# Patient Record
Sex: FEMALE | Race: WHITE | Employment: FULL TIME | URBAN - METROPOLITAN AREA
[De-identification: names, ages, dates, MRNs, and addresses within clinical notes are randomized per-mention and may not be internally consistent; named-entity substitution may affect disease eponyms.]

---

## 2017-08-16 ENCOUNTER — NURSE ONLY (OUTPATIENT)
Dept: ENDOCRINOLOGY CLINIC | Facility: CLINIC | Age: 32
End: 2017-08-16

## 2017-08-16 DIAGNOSIS — O24.410 DIET CONTROLLED GESTATIONAL DIABETES MELLITUS (GDM) IN THIRD TRIMESTER: Primary | ICD-10-CM

## 2017-08-16 PROCEDURE — G0108 DIAB MANAGE TRN  PER INDIV: HCPCS

## 2017-08-17 VITALS
DIASTOLIC BLOOD PRESSURE: 77 MMHG | HEIGHT: 63 IN | HEART RATE: 98 BPM | SYSTOLIC BLOOD PRESSURE: 134 MMHG | WEIGHT: 243 LBS | BODY MASS INDEX: 43.05 KG/M2

## 2017-08-17 NOTE — PROGRESS NOTES
Misha Rivero  :2/10/1985 was seen for Gestational Diabetes Counseling: Individual/Group    Date: 2017       Assessment:/77   Pulse 98   Ht 63\"   Wt 243 lb   BMI 43.05 kg/m²     : 1  Para: 0  SONDRA:   History of GMD:  NO    Ob Provided:  Magali Diabetes and Pregnancy: A guide to self management  BG Log Sheets    Recommendations:      1. Follow recommended meal plan. 2. Begin testing fasting glucose and 2 hour after meals   3.  Bring glucose / food log to next MD office

## 2017-08-27 NOTE — PROGRESS NOTES
Outpatient Maternal-Fetal Medicine Consultation    Dear Dr. Katherine Holloway    Thank you for requesting ultrasound evaluation and maternal fetal medicine consultation on your patient Sarah Savage.   As you are aware she is a 33year old female  with a [place] gestation.   Biometry:  BPD 69.0 mm 64th% 27w5d (27w0d to 28w3d)  .0 mm 27th% 27w1d (25w1d to 29w2d)  .9 mm 72nd% 28w0d (27w2d to 28w5d)  FL 47.1 mm 8th% 25w5d (23w4d to 27w5d)  OFD 87.3 mm 32nd% 26w3d  TCD 30.7 mm 66th% 27w4d  HUM 44.2 mm 28th% parathyroid hypoplasia). Ninety percent of patients with DGS have deletions in chromosome 22q11.2.  This mutation is relatively common in the general population, and has been identified in patients with other similar syndromes, such as \"conotruncal anomali strong association with obesity in the general population, type 2 diabetes mellitus is one of the two most common medical complications of the obese .  The increased risk of type 2 diabetes is primarily related to an exaggerated increase in insulin r risk of neural tube defects increased significantly with maternal weight. The analysis found that overweight and obese pregnant women experienced significantly more stillbirths than normal weight women.       Increase  testing and Level 2 Ultras

## 2017-08-28 ENCOUNTER — OFFICE VISIT (OUTPATIENT)
Dept: PERINATAL CARE | Facility: HOSPITAL | Age: 32
End: 2017-08-28
Attending: OBSTETRICS & GYNECOLOGY
Payer: COMMERCIAL

## 2017-08-28 VITALS
HEIGHT: 63 IN | DIASTOLIC BLOOD PRESSURE: 61 MMHG | HEART RATE: 98 BPM | WEIGHT: 243 LBS | SYSTOLIC BLOOD PRESSURE: 125 MMHG | BODY MASS INDEX: 43.05 KG/M2

## 2017-08-28 DIAGNOSIS — Z84.89 FAMILY HISTORY OF GENETIC DISORDER: Primary | ICD-10-CM

## 2017-08-28 DIAGNOSIS — O99.213 OBESITY AFFECTING PREGNANCY IN THIRD TRIMESTER: ICD-10-CM

## 2017-08-28 PROCEDURE — 76811 OB US DETAILED SNGL FETUS: CPT

## 2017-08-28 PROCEDURE — 99243 OFF/OP CNSLTJ NEW/EST LOW 30: CPT

## 2017-08-28 RX ORDER — MELATONIN
325
COMMUNITY
End: 2017-11-17

## 2017-08-28 RX ORDER — CHOLECALCIFEROL (VITAMIN D3) 25 MCG
1 TABLET,CHEWABLE ORAL DAILY
COMMUNITY
End: 2019-08-07 | Stop reason: CLARIF

## 2017-09-06 ENCOUNTER — NURSE ONLY (OUTPATIENT)
Dept: ENDOCRINOLOGY CLINIC | Facility: CLINIC | Age: 32
End: 2017-09-06

## 2017-09-06 VITALS
WEIGHT: 244 LBS | BODY MASS INDEX: 43.23 KG/M2 | DIASTOLIC BLOOD PRESSURE: 71 MMHG | HEIGHT: 63 IN | SYSTOLIC BLOOD PRESSURE: 104 MMHG

## 2017-09-06 DIAGNOSIS — O24.410 DIET CONTROLLED GESTATIONAL DIABETES MELLITUS (GDM) IN THIRD TRIMESTER: Primary | ICD-10-CM

## 2017-09-06 PROCEDURE — G0108 DIAB MANAGE TRN  PER INDIV: HCPCS

## 2017-09-06 NOTE — PROGRESS NOTES
Javi Duane  : 2/10/1985 was seen for GDM Follow-Up Counseling    Date: 2017       Assessment: /71   Ht 63\"   Wt 244 lb   LMP 2017   BMI 43.22 kg/m²   Weight: Wt Readings from Last 6 Encounters:  17 : 244 lb  17 : 243 l in 2 weeks    Patient verbalized understanding and has no further questions at this time.     Megan Coats, RN, CDE

## 2017-09-14 ENCOUNTER — HOSPITAL ENCOUNTER (OUTPATIENT)
Facility: HOSPITAL | Age: 32
Setting detail: OBSERVATION
Discharge: HOME | End: 2017-09-15
Attending: OBSTETRICS & GYNECOLOGY | Admitting: OBSTETRICS & GYNECOLOGY
Payer: COMMERCIAL

## 2017-09-14 VITALS
DIASTOLIC BLOOD PRESSURE: 77 MMHG | TEMPERATURE: 98 F | BODY MASS INDEX: 43.05 KG/M2 | HEIGHT: 63 IN | SYSTOLIC BLOOD PRESSURE: 128 MMHG | WEIGHT: 243 LBS | HEART RATE: 100 BPM

## 2017-09-14 PROBLEM — Z34.90 PREGNANCY: Status: ACTIVE | Noted: 2017-09-14

## 2017-09-14 PROBLEM — Z34.90 PREGNANCY (HCC): Status: ACTIVE | Noted: 2017-09-14

## 2017-09-14 LAB
BILIRUBIN URINE: NEGATIVE
BLOOD URINE: NEGATIVE
BLOOD URINE: NEGATIVE
CONTROL RUN WITHIN 24 HOURS?: YES
CONTROL RUN WITHIN 24 HOURS?: YES
GLUCOSE URINE: NEGATIVE
GLUCOSE URINE: NEGATIVE
KETONE URINE: 15
KETONE URINE: >=160
LEUKOCYTE ESTERASE URINE: NEGATIVE
LEUKOCYTE ESTERASE URINE: NEGATIVE
NITRITE URINE: NEGATIVE
NITRITE URINE: NEGATIVE
PH URINE: 5 (ref 5–8)
PH URINE: 5.5 (ref 5–8)
PROTEIN URINE: NEGATIVE
SPEC GRAVITY: <=1.005 (ref 1–1.03)
SPEC GRAVITY: >=1.03 (ref 1–1.03)
URINE CLARITY: CLEAR
URINE CLARITY: CLEAR
URINE COLOR: YELLOW
URINE COLOR: YELLOW
UROBILINOGEN URINE: 0.2
UROBILINOGEN URINE: 0.2

## 2017-09-14 PROCEDURE — 96360 HYDRATION IV INFUSION INIT: CPT

## 2017-09-14 PROCEDURE — 96361 HYDRATE IV INFUSION ADD-ON: CPT

## 2017-09-14 PROCEDURE — 81002 URINALYSIS NONAUTO W/O SCOPE: CPT

## 2017-09-14 RX ORDER — SODIUM CHLORIDE, SODIUM LACTATE, POTASSIUM CHLORIDE, CALCIUM CHLORIDE 600; 310; 30; 20 MG/100ML; MG/100ML; MG/100ML; MG/100ML
INJECTION, SOLUTION INTRAVENOUS CONTINUOUS
Status: DISCONTINUED | OUTPATIENT
Start: 2017-09-14 | End: 2017-09-15

## 2017-09-15 NOTE — PROGRESS NOTES
Pt to triage with c/o cramping in her lower back and in front. Monitor applied to pt. Pt had diarrhea yesterday.

## 2017-09-15 NOTE — NST
Nonstress Test   Patient: Juana Vincent    Gestation: 64Q5E    NST:       Variability: Moderate           Accelerations: Yes           Decelerations: None            Baseline: 135 BPM           Uterine Irritability: No           Contractions: Not pres

## 2017-09-20 ENCOUNTER — NURSE ONLY (OUTPATIENT)
Dept: ENDOCRINOLOGY CLINIC | Facility: CLINIC | Age: 32
End: 2017-09-20

## 2017-09-20 VITALS — WEIGHT: 240 LBS | BODY MASS INDEX: 43 KG/M2

## 2017-09-20 DIAGNOSIS — O24.410 DIET CONTROLLED GESTATIONAL DIABETES MELLITUS (GDM) IN THIRD TRIMESTER: Primary | ICD-10-CM

## 2017-09-20 PROCEDURE — G0108 DIAB MANAGE TRN  PER INDIV: HCPCS

## 2017-09-21 NOTE — PROGRESS NOTES
Tony Smith  : 2/10/1985 was seen for GDM Follow-Up Counseling    Date: 2017       Assessment: Wt 240 lb   LMP 2017   BMI 42.51 kg/m²   Weight: Wt Readings from Last 6 Encounters:  17 : 240 lb  17 : 243 lb  17 : 244 l questions at this time.     Emelie Blizzard, RN, CDE

## 2017-09-26 NOTE — PROGRESS NOTES
Outpatient Maternal-Fetal Medicine Consultation     Dear Dr. Madelyn Freitas     Thank you for requesting a maternal fetal medicine consultation on your patient Misha Rivero.   As you are aware she is a 33year old female  with a [place] pregnancy and an Es gestation.   Biometry:  BPD 82.5 mm 89th% 33w1d (32w1d to 34w1d)  .7 mm 67th% 33w2d (30w2d to 36w1d)  .2 mm >95th% 33w6d (32w6d to 34w6d)  FL 55.2 mm <5th% 29w1d (27w0d to 31w1d)  .0 mm 72nd% 32w3d  TCD 39.3 mm 93rd% 34w0d  HUM 49.6 mm 10 diabetes mellitus. Data suggest that obese women should be encouraged to undertake a weight reduction program (diet, exercise, behavior modification, and possibly bariatric surgery in some cases) prior to attempting to conceive.              Due to its st operative times, wound infection, thromboembolism, and endometritis. Maternal obesity appears to be associated with a small increase in the absolute rate of some congenital anomalies, and the risk may increase with increasing maternal weight.   Derek Mitchell therapy; her compliance with her diabetic diet regimen was reviewed and it is poor. The patient is presently taking Metformin 500 mg in AM and midday and 1000 mg in the evening; her compliance with her medication regimen was reviewed and it is good.

## 2017-09-27 ENCOUNTER — OFFICE VISIT (OUTPATIENT)
Dept: PERINATAL CARE | Facility: HOSPITAL | Age: 32
End: 2017-09-27
Attending: OBSTETRICS & GYNECOLOGY
Payer: COMMERCIAL

## 2017-09-27 VITALS
HEIGHT: 63 IN | DIASTOLIC BLOOD PRESSURE: 71 MMHG | WEIGHT: 243 LBS | SYSTOLIC BLOOD PRESSURE: 138 MMHG | BODY MASS INDEX: 43.05 KG/M2 | HEART RATE: 109 BPM

## 2017-09-27 DIAGNOSIS — E66.01 MORBID OBESITY WITH BMI OF 45.0-49.9, ADULT (HCC): ICD-10-CM

## 2017-09-27 DIAGNOSIS — IMO0001 INSULIN DEPENDENT GESTATIONAL DIABETES MELLITUS (GDM), ANTEPARTUM: ICD-10-CM

## 2017-09-27 PROCEDURE — 76819 FETAL BIOPHYS PROFIL W/O NST: CPT | Performed by: OBSTETRICS & GYNECOLOGY

## 2017-09-27 PROCEDURE — 99215 OFFICE O/P EST HI 40 MIN: CPT | Performed by: OBSTETRICS & GYNECOLOGY

## 2017-09-27 PROCEDURE — 76805 OB US >/= 14 WKS SNGL FETUS: CPT | Performed by: OBSTETRICS & GYNECOLOGY

## 2017-10-06 ENCOUNTER — TELEPHONE (OUTPATIENT)
Dept: PERINATAL CARE | Facility: HOSPITAL | Age: 32
End: 2017-10-06

## 2017-10-06 NOTE — TELEPHONE ENCOUNTER
Pt notified of changes in diabetic management. NPH am 10 units HS 26 units. Novolog am 12 units lunch 10 units dinner 12 units.  Verbalizes understanding

## 2017-10-18 ENCOUNTER — OFFICE VISIT (OUTPATIENT)
Dept: PERINATAL CARE | Facility: HOSPITAL | Age: 32
End: 2017-10-18
Attending: OBSTETRICS & GYNECOLOGY
Payer: COMMERCIAL

## 2017-10-18 VITALS
BODY MASS INDEX: 44 KG/M2 | HEART RATE: 87 BPM | DIASTOLIC BLOOD PRESSURE: 69 MMHG | WEIGHT: 246 LBS | SYSTOLIC BLOOD PRESSURE: 147 MMHG

## 2017-10-18 DIAGNOSIS — IMO0001 INSULIN DEPENDENT GESTATIONAL DIABETES MELLITUS (GDM), ANTEPARTUM: ICD-10-CM

## 2017-10-18 DIAGNOSIS — E66.01 MORBID OBESITY WITH BMI OF 40.0-44.9, ADULT (HCC): ICD-10-CM

## 2017-10-18 DIAGNOSIS — Z84.89 FAMILY HISTORY OF GENETIC DISORDER: ICD-10-CM

## 2017-10-18 PROCEDURE — 76805 OB US >/= 14 WKS SNGL FETUS: CPT | Performed by: OBSTETRICS & GYNECOLOGY

## 2017-10-18 PROCEDURE — 99213 OFFICE O/P EST LOW 20 MIN: CPT | Performed by: OBSTETRICS & GYNECOLOGY

## 2017-10-18 PROCEDURE — 76819 FETAL BIOPHYS PROFIL W/O NST: CPT | Performed by: OBSTETRICS & GYNECOLOGY

## 2017-10-18 NOTE — PROGRESS NOTES
Indication: follow-up for fetal growth.   ____________________________________________________________________________  History: Age: 33 years.  : 1 Para: 0.  ____________________________________________________________________________  Dating:  LMP: for  [employer] regarding obesity and diabetes . Her prenatal records and labs were reviewed. G1  Allergy - NKDA       Medication - Metformin, insulin      Medical Regimen Recommendation: begin NPH and novolog insulin.   NPH 10 units qAM, 20 un twice weekly  (weekly NST & Weekly BPP)  4. Plan delivery at 38 weeks for uncontrolled GDM A2  5. increase NPH 12 AM / 28 HS. No change in novolog. Thank you for allowing me to participate in the care of your patient.   Please do not hesitate to call

## 2017-10-26 ENCOUNTER — TELEPHONE (OUTPATIENT)
Dept: PERINATAL CARE | Facility: HOSPITAL | Age: 32
End: 2017-10-26

## 2017-11-01 ENCOUNTER — TELEPHONE (OUTPATIENT)
Dept: PERINATAL CARE | Facility: HOSPITAL | Age: 32
End: 2017-11-01

## 2017-11-01 NOTE — TELEPHONE ENCOUNTER
Pt notified of changes in diabetic management. NPH am 12 units HS 32 units. Humalog am 12 units lunch 10 units PM 12 units.  ML

## 2017-11-08 ENCOUNTER — TELEPHONE (OUTPATIENT)
Dept: OBGYN UNIT | Facility: HOSPITAL | Age: 32
End: 2017-11-08

## 2017-11-13 ENCOUNTER — APPOINTMENT (OUTPATIENT)
Dept: OBGYN CLINIC | Facility: HOSPITAL | Age: 32
End: 2017-11-13
Payer: COMMERCIAL

## 2017-11-13 ENCOUNTER — HOSPITAL ENCOUNTER (INPATIENT)
Facility: HOSPITAL | Age: 32
LOS: 4 days | Discharge: HOME | End: 2017-11-17
Attending: OBSTETRICS & GYNECOLOGY | Admitting: OBSTETRICS & GYNECOLOGY
Payer: COMMERCIAL

## 2017-11-13 PROCEDURE — 86850 RBC ANTIBODY SCREEN: CPT | Performed by: OBSTETRICS & GYNECOLOGY

## 2017-11-13 PROCEDURE — 86780 TREPONEMA PALLIDUM: CPT | Performed by: OBSTETRICS & GYNECOLOGY

## 2017-11-13 PROCEDURE — 81002 URINALYSIS NONAUTO W/O SCOPE: CPT

## 2017-11-13 PROCEDURE — 84550 ASSAY OF BLOOD/URIC ACID: CPT | Performed by: OBSTETRICS & GYNECOLOGY

## 2017-11-13 PROCEDURE — 82962 GLUCOSE BLOOD TEST: CPT

## 2017-11-13 PROCEDURE — 86900 BLOOD TYPING SEROLOGIC ABO: CPT | Performed by: OBSTETRICS & GYNECOLOGY

## 2017-11-13 PROCEDURE — 80053 COMPREHEN METABOLIC PANEL: CPT | Performed by: OBSTETRICS & GYNECOLOGY

## 2017-11-13 PROCEDURE — 85027 COMPLETE CBC AUTOMATED: CPT | Performed by: OBSTETRICS & GYNECOLOGY

## 2017-11-13 PROCEDURE — 3E0P7VZ INTRODUCTION OF HORMONE INTO FEMALE REPRODUCTIVE, VIA NATURAL OR ARTIFICIAL OPENING: ICD-10-PCS | Performed by: OBSTETRICS & GYNECOLOGY

## 2017-11-13 PROCEDURE — 86901 BLOOD TYPING SEROLOGIC RH(D): CPT | Performed by: OBSTETRICS & GYNECOLOGY

## 2017-11-13 RX ORDER — SODIUM CHLORIDE, SODIUM LACTATE, POTASSIUM CHLORIDE, CALCIUM CHLORIDE 600; 310; 30; 20 MG/100ML; MG/100ML; MG/100ML; MG/100ML
INJECTION, SOLUTION INTRAVENOUS CONTINUOUS
Status: DISCONTINUED | OUTPATIENT
Start: 2017-11-13 | End: 2017-11-14

## 2017-11-13 RX ORDER — TRISODIUM CITRATE DIHYDRATE AND CITRIC ACID MONOHYDRATE 500; 334 MG/5ML; MG/5ML
30 SOLUTION ORAL AS NEEDED
Status: DISCONTINUED | OUTPATIENT
Start: 2017-11-13 | End: 2017-11-14

## 2017-11-13 RX ORDER — TERBUTALINE SULFATE 1 MG/ML
0.25 INJECTION, SOLUTION SUBCUTANEOUS AS NEEDED
Status: DISCONTINUED | OUTPATIENT
Start: 2017-11-13 | End: 2017-11-14

## 2017-11-13 RX ORDER — IBUPROFEN 600 MG/1
600 TABLET ORAL ONCE AS NEEDED
Status: DISCONTINUED | OUTPATIENT
Start: 2017-11-13 | End: 2017-11-14

## 2017-11-13 RX ORDER — DEXTROSE, SODIUM CHLORIDE, SODIUM LACTATE, POTASSIUM CHLORIDE, AND CALCIUM CHLORIDE 5; .6; .31; .03; .02 G/100ML; G/100ML; G/100ML; G/100ML; G/100ML
INJECTION, SOLUTION INTRAVENOUS AS NEEDED
Status: DISCONTINUED | OUTPATIENT
Start: 2017-11-13 | End: 2017-11-14

## 2017-11-13 RX ORDER — ZOLPIDEM TARTRATE 5 MG/1
5 TABLET ORAL NIGHTLY PRN
Status: DISCONTINUED | OUTPATIENT
Start: 2017-11-13 | End: 2017-11-14

## 2017-11-13 RX ORDER — DEXTROSE MONOHYDRATE 100 MG/ML
INJECTION, SOLUTION INTRAVENOUS CONTINUOUS
Status: DISCONTINUED | OUTPATIENT
Start: 2017-11-14 | End: 2017-11-14

## 2017-11-14 ENCOUNTER — ANESTHESIA EVENT (OUTPATIENT)
Dept: OBGYN UNIT | Facility: HOSPITAL | Age: 32
End: 2017-11-14

## 2017-11-14 ENCOUNTER — SURGERY (OUTPATIENT)
Age: 32
End: 2017-11-14

## 2017-11-14 ENCOUNTER — ANESTHESIA (OUTPATIENT)
Dept: OBGYN UNIT | Facility: HOSPITAL | Age: 32
End: 2017-11-14

## 2017-11-14 PROBLEM — O24.419 DM (DIABETES MELLITUS), GESTATIONAL: Status: ACTIVE | Noted: 2017-11-14

## 2017-11-14 PROCEDURE — 82962 GLUCOSE BLOOD TEST: CPT

## 2017-11-14 PROCEDURE — 3E033VJ INTRODUCTION OF OTHER HORMONE INTO PERIPHERAL VEIN, PERCUTANEOUS APPROACH: ICD-10-PCS | Performed by: OBSTETRICS & GYNECOLOGY

## 2017-11-14 RX ORDER — DEXTROSE, SODIUM CHLORIDE, SODIUM LACTATE, POTASSIUM CHLORIDE, AND CALCIUM CHLORIDE 5; .6; .31; .03; .02 G/100ML; G/100ML; G/100ML; G/100ML; G/100ML
INJECTION, SOLUTION INTRAVENOUS CONTINUOUS
Status: DISCONTINUED | OUTPATIENT
Start: 2017-11-15 | End: 2017-11-17

## 2017-11-14 RX ORDER — DIPHENHYDRAMINE HCL 25 MG
25 CAPSULE ORAL EVERY 4 HOURS PRN
Status: DISCONTINUED | OUTPATIENT
Start: 2017-11-14 | End: 2017-11-17

## 2017-11-14 RX ORDER — HYDROCODONE BITARTRATE AND ACETAMINOPHEN 5; 325 MG/1; MG/1
1 TABLET ORAL EVERY 4 HOURS PRN
Status: DISCONTINUED | OUTPATIENT
Start: 2017-11-14 | End: 2017-11-17

## 2017-11-14 RX ORDER — SODIUM CHLORIDE, SODIUM LACTATE, POTASSIUM CHLORIDE, CALCIUM CHLORIDE 600; 310; 30; 20 MG/100ML; MG/100ML; MG/100ML; MG/100ML
INJECTION, SOLUTION INTRAVENOUS CONTINUOUS
Status: DISCONTINUED | OUTPATIENT
Start: 2017-11-14 | End: 2017-11-14

## 2017-11-14 RX ORDER — DOCUSATE SODIUM 100 MG/1
100 CAPSULE, LIQUID FILLED ORAL
Status: DISCONTINUED | OUTPATIENT
Start: 2017-11-15 | End: 2017-11-17

## 2017-11-14 RX ORDER — ZOLPIDEM TARTRATE 5 MG/1
5 TABLET ORAL NIGHTLY PRN
Status: DISCONTINUED | OUTPATIENT
Start: 2017-11-14 | End: 2017-11-17

## 2017-11-14 RX ORDER — DIPHENHYDRAMINE HYDROCHLORIDE 50 MG/ML
25 INJECTION INTRAMUSCULAR; INTRAVENOUS ONCE AS NEEDED
Status: DISCONTINUED | OUTPATIENT
Start: 2017-11-14 | End: 2017-11-14

## 2017-11-14 RX ORDER — HYDROCODONE BITARTRATE AND ACETAMINOPHEN 10; 325 MG/1; MG/1
1 TABLET ORAL EVERY 4 HOURS PRN
Status: DISCONTINUED | OUTPATIENT
Start: 2017-11-14 | End: 2017-11-17

## 2017-11-14 RX ORDER — KETOROLAC TROMETHAMINE 30 MG/ML
30 INJECTION, SOLUTION INTRAMUSCULAR; INTRAVENOUS ONCE AS NEEDED
Status: DISCONTINUED | OUTPATIENT
Start: 2017-11-14 | End: 2017-11-14

## 2017-11-14 RX ORDER — DEXTROSE MONOHYDRATE 25 G/50ML
50 INJECTION, SOLUTION INTRAVENOUS
Status: DISCONTINUED | OUTPATIENT
Start: 2017-11-14 | End: 2017-11-14 | Stop reason: HOSPADM

## 2017-11-14 RX ORDER — METOCLOPRAMIDE HYDROCHLORIDE 5 MG/ML
10 INJECTION INTRAMUSCULAR; INTRAVENOUS EVERY 6 HOURS PRN
Status: DISCONTINUED | OUTPATIENT
Start: 2017-11-14 | End: 2017-11-17

## 2017-11-14 RX ORDER — ONDANSETRON 2 MG/ML
4 INJECTION INTRAMUSCULAR; INTRAVENOUS ONCE AS NEEDED
Status: DISCONTINUED | OUTPATIENT
Start: 2017-11-14 | End: 2017-11-14

## 2017-11-14 RX ORDER — SIMETHICONE 80 MG
80 TABLET,CHEWABLE ORAL 3 TIMES DAILY PRN
Status: DISCONTINUED | OUTPATIENT
Start: 2017-11-14 | End: 2017-11-17

## 2017-11-14 RX ORDER — CEFAZOLIN SODIUM 1 G/3ML
INJECTION, POWDER, FOR SOLUTION INTRAMUSCULAR; INTRAVENOUS
Status: DISCONTINUED | OUTPATIENT
Start: 2017-11-14 | End: 2017-11-14

## 2017-11-14 RX ORDER — IBUPROFEN 600 MG/1
600 TABLET ORAL EVERY 6 HOURS SCHEDULED
Status: DISCONTINUED | OUTPATIENT
Start: 2017-11-15 | End: 2017-11-17

## 2017-11-14 RX ORDER — DEXTROSE MONOHYDRATE 25 G/50ML
50 INJECTION, SOLUTION INTRAVENOUS
Status: DISCONTINUED | OUTPATIENT
Start: 2017-11-14 | End: 2017-11-14

## 2017-11-14 RX ORDER — HYDROMORPHONE HYDROCHLORIDE 1 MG/ML
0.4 INJECTION, SOLUTION INTRAMUSCULAR; INTRAVENOUS; SUBCUTANEOUS EVERY 5 MIN PRN
Status: DISCONTINUED | OUTPATIENT
Start: 2017-11-14 | End: 2017-11-14

## 2017-11-14 RX ORDER — ONDANSETRON 2 MG/ML
4 INJECTION INTRAMUSCULAR; INTRAVENOUS EVERY 6 HOURS PRN
Status: DISCONTINUED | OUTPATIENT
Start: 2017-11-14 | End: 2017-11-17

## 2017-11-14 RX ORDER — DIPHENHYDRAMINE HYDROCHLORIDE 50 MG/ML
12.5 INJECTION INTRAMUSCULAR; INTRAVENOUS EVERY 4 HOURS PRN
Status: DISCONTINUED | OUTPATIENT
Start: 2017-11-14 | End: 2017-11-17

## 2017-11-14 RX ORDER — KETOROLAC TROMETHAMINE 30 MG/ML
30 INJECTION, SOLUTION INTRAMUSCULAR; INTRAVENOUS EVERY 6 HOURS PRN
Status: DISPENSED | OUTPATIENT
Start: 2017-11-14 | End: 2017-11-15

## 2017-11-14 RX ORDER — NALBUPHINE HCL 10 MG/ML
2.5 AMPUL (ML) INJECTION
Status: DISCONTINUED | OUTPATIENT
Start: 2017-11-14 | End: 2017-11-14

## 2017-11-14 RX ORDER — BISACODYL 10 MG
10 SUPPOSITORY, RECTAL RECTAL
Status: DISCONTINUED | OUTPATIENT
Start: 2017-11-14 | End: 2017-11-17

## 2017-11-14 RX ORDER — METHYLERGONOVINE MALEATE 0.2 MG/1
0.2 TABLET ORAL EVERY 8 HOURS
Status: COMPLETED | OUTPATIENT
Start: 2017-11-14 | End: 2017-11-15

## 2017-11-14 RX ORDER — TRISODIUM CITRATE DIHYDRATE AND CITRIC ACID MONOHYDRATE 500; 334 MG/5ML; MG/5ML
30 SOLUTION ORAL ONCE
Status: COMPLETED | OUTPATIENT
Start: 2017-11-14 | End: 2017-11-14

## 2017-11-14 RX ORDER — NALBUPHINE HCL 10 MG/ML
2.5 AMPUL (ML) INJECTION EVERY 4 HOURS PRN
Status: DISCONTINUED | OUTPATIENT
Start: 2017-11-14 | End: 2017-11-17

## 2017-11-14 RX ORDER — SODIUM CHLORIDE, SODIUM LACTATE, POTASSIUM CHLORIDE, CALCIUM CHLORIDE 600; 310; 30; 20 MG/100ML; MG/100ML; MG/100ML; MG/100ML
INJECTION, SOLUTION INTRAVENOUS CONTINUOUS
Status: DISCONTINUED | OUTPATIENT
Start: 2017-11-14 | End: 2017-11-17

## 2017-11-14 RX ORDER — NALOXONE HYDROCHLORIDE 0.4 MG/ML
0.08 INJECTION, SOLUTION INTRAMUSCULAR; INTRAVENOUS; SUBCUTANEOUS
Status: ACTIVE | OUTPATIENT
Start: 2017-11-14 | End: 2017-11-15

## 2017-11-14 RX ORDER — HYDROMORPHONE HYDROCHLORIDE 1 MG/ML
0.4 INJECTION, SOLUTION INTRAMUSCULAR; INTRAVENOUS; SUBCUTANEOUS EVERY 2 HOUR PRN
Status: DISPENSED | OUTPATIENT
Start: 2017-11-14 | End: 2017-11-15

## 2017-11-14 NOTE — PROGRESS NOTES
Admit Note    Insulin dependent Gestational Diabetic admitted last night for induction - Cervidil placed. Sugars normal. Blood Pressures borderline so CMP ordered and Urine dip done with only trace protein.     Recent Labs   Lab  11/13/17 1923   GLU  96

## 2017-11-14 NOTE — H&P
Tila [facility] Patient Status:  Inpatient    2/10/1985 MRN [id number]   Location 1818 [facility] Attending Sarah Coombs MD   [facility] Day # 1 PCP None Pcp     Subjective:  Maria A Fritz discussed in detail with the patient. Pre-admission, admission, and post admission procedures and expectations were discussed in detail. All questions answered, all appropriate consents will be signed at the Hospital. Admission is planned for today.    In

## 2017-11-14 NOTE — ANESTHESIA PREPROCEDURE EVALUATION
PRE-OP EVALUATION    Patient Name: Jessica Jose    Pre-op Diagnosis: * No pre-op diagnosis entered *    Procedure(s):      Surgeon(s) and Role:     * Cherelle Garcia MD - Primary     * Annita Aguilar MD - Assisting Surgeon    Pre-op vitals reviewed. injection 32 Units 32 Units Subcutaneous Once at night   dextrose 10 % infusion  Intravenous Continuous       Outpatient Medications:    Insulin NPH, Human,, Isophane, 100 UNIT/ML Subcutaneous Suspension Pen-injector Inject 12 Units into the skin before br Airway      Mallampati: III  Mouth opening: >3 FB  TM distance: 4 - 6 cm  Neck ROM: full Cardiovascular    Cardiovascular exam normal.  Rhythm: regular  Rate: normal  (-) murmur   Dental    No notable dental history.          Pulmonary    Pulmonary ex

## 2017-11-14 NOTE — PROGRESS NOTES
S: pt noting cramping  O: oxytocin at 20    SVE: closed/high/soft,pelvis feels contracted  FHTs:130s mod variability  Ctx q 2-3    A/P:38+1 wk IUP , gest DM on insulin  No progress with induction.  Thorough discussion regarding continuing induction, with in

## 2017-11-15 PROCEDURE — 90471 IMMUNIZATION ADMIN: CPT

## 2017-11-15 PROCEDURE — 82962 GLUCOSE BLOOD TEST: CPT

## 2017-11-15 PROCEDURE — 85025 COMPLETE CBC W/AUTO DIFF WBC: CPT | Performed by: OBSTETRICS & GYNECOLOGY

## 2017-11-15 NOTE — PROGRESS NOTES
Report given to [facility] RN. Patient and infant left in stable condition. Infant will continued to be monitored for grunting and oxygen saturation.  Neonatologist made aware

## 2017-11-15 NOTE — PROGRESS NOTES
[facility]    Patients Name: China Head  Attending Physician: Kapil López MD  CSN: [id number]    Location:  2210/2210-A  MRN: [id number]    YOB: 1985  Admission Date: 11/13/2017     Obstetric Anesthesia Pain Progress Not

## 2017-11-15 NOTE — OPERATIVE REPORT
[facility]   Section - Operative Note    Felipe Vincent Patient Status:  Inpatient    2/10/1985 MRN [id number]   Location [address] [place] [address] Attending Adam Schaefer MD   Hosp Day # 1 PCP None Pcp   Preoperative Diag appearance. The uterine cavity was swept clean using a wet lap. The first layer of the hysterotomy was closed using 0 Vicryl. A second imbricating layer was placed with 0 Vicryl. The incision was inspected for hemostasis.   Re-inspection of the hystero

## 2017-11-15 NOTE — ANESTHESIA POSTPROCEDURE EVALUATION
Yogesh 66 Patient Status:  Inpatient   Age/Gender 33year old female MRN [id number]   Location [address] [place] [address] Attending Shun Deras MD   [facility] Day # 1 PCP None Pcp       Anesthesia Post-op Note    Proce

## 2017-11-15 NOTE — PROGRESS NOTES
[facility]  Post-Partum Caesarean Section Progress Note    Trang Giron Patient Status:  Inpatient    2/10/1985 MRN [id number]   [facility] 2SW-J Attending Meera Acevedo MD   [facility] Day # 2 PCP None Pcp     SUBJECTIVE:    P

## 2017-11-15 NOTE — PROGRESS NOTES
Pt transferred  to Mother Baby room 2210 in stable condition. Report given to MARILIN Garcia. Infant transferred with mother in stable condition.

## 2017-11-16 NOTE — PROGRESS NOTES
[facility]  Post-Partum Caesarean Section Progress Note    Jeanna Colónpau Patient Status:  Inpatient    2/10/1985 MRN [id number]   [facility] 2SW-J Attending Elisa Young MD   [facility] Day # 3 PCP None Pcp     SUBJECTIVE:    P

## 2017-11-17 VITALS
SYSTOLIC BLOOD PRESSURE: 141 MMHG | WEIGHT: 248 LBS | BODY MASS INDEX: 43.94 KG/M2 | TEMPERATURE: 98 F | HEART RATE: 96 BPM | OXYGEN SATURATION: 96 % | DIASTOLIC BLOOD PRESSURE: 80 MMHG | HEIGHT: 63 IN | RESPIRATION RATE: 20 BRPM

## 2017-11-17 RX ORDER — HYDROCODONE BITARTRATE AND ACETAMINOPHEN 5; 325 MG/1; MG/1
1-2 TABLET ORAL EVERY 4 HOURS PRN
Qty: 40 TABLET | Refills: 0 | Status: SHIPPED | OUTPATIENT
Start: 2017-11-17 | End: 2019-08-07 | Stop reason: CLARIF

## 2017-11-17 NOTE — PROGRESS NOTES
[facility]  Post-Partum Caesarean Section Progress Note    Erin Romero Patient Status:  Inpatient    2/10/1985 MRN [id number]   [facility] 2SW-J Attending Haylie Sofia MD   [facility] Day # 4 PCP None Pcp     SUBJECTIVE:    P

## 2017-11-17 NOTE — DISCHARGE SUMMARY
[facility]  Discharge Summary    Cj Russ Patient Status:  Inpatient    2/10/1985 MRN [id number]   [facility] 2SW-J Attending Marco Antonio Adams MD   [address] Day # 4 PCP None Pcp         EDC: Estimated Date of Delivery:

## 2017-11-17 NOTE — PROGRESS NOTES
NURSING DISCHARGE NOTE    Discharged Home via Ambulatory. Accompanied by Spouse and Support staff  Belongings Taken by patient/family. Patient discharged home with baby. Hugs and kisses tags removed. Discharge teaching completed.  All questions answ

## 2017-11-21 ENCOUNTER — TELEPHONE (OUTPATIENT)
Dept: OBGYN UNIT | Facility: HOSPITAL | Age: 32
End: 2017-11-21

## 2017-11-21 NOTE — PROGRESS NOTES
Reviewed self and infant care w / mom, she verbalizes understanding of instructions reviewed. Encourage to follow up w/ MDs as directed and w/ questions/concerns. All PIH sx reviewed w good understanding. Enc to go to ct.

## 2018-01-20 ENCOUNTER — CHARTING TRANS (OUTPATIENT)
Dept: OTHER | Age: 33
End: 2018-01-20

## 2018-11-02 VITALS
SYSTOLIC BLOOD PRESSURE: 120 MMHG | WEIGHT: 223.21 LBS | DIASTOLIC BLOOD PRESSURE: 74 MMHG | BODY MASS INDEX: 39.55 KG/M2 | HEIGHT: 63 IN | TEMPERATURE: 98 F | RESPIRATION RATE: 16 BRPM | HEART RATE: 88 BPM | OXYGEN SATURATION: 97 %

## 2019-07-24 PROBLEM — Z30.430 ENCOUNTER FOR IUD INSERTION: Status: ACTIVE | Noted: 2019-07-24

## 2019-07-29 PROBLEM — Z34.90 PREGNANCY (HCC): Status: RESOLVED | Noted: 2017-09-14 | Resolved: 2019-07-29

## 2019-07-29 PROBLEM — O24.419 DM (DIABETES MELLITUS), GESTATIONAL: Status: RESOLVED | Noted: 2017-11-14 | Resolved: 2019-07-29

## 2019-07-29 PROBLEM — D82.1 DIGEORGE SYNDROME (HCC): Status: ACTIVE | Noted: 2019-07-29

## 2019-07-29 PROBLEM — IMO0001 INSULIN DEPENDENT GESTATIONAL DIABETES MELLITUS (GDM), ANTEPARTUM: Status: RESOLVED | Noted: 2017-09-27 | Resolved: 2019-07-29

## 2019-07-29 PROBLEM — Z98.891 H/O: CESAREAN SECTION: Status: ACTIVE | Noted: 2019-07-29

## 2019-07-29 PROBLEM — Z34.90 PREGNANCY: Status: RESOLVED | Noted: 2017-09-14 | Resolved: 2019-07-29

## 2019-08-07 PROBLEM — E66.01 OBESITY, CLASS III, BMI 40-49.9 (MORBID OBESITY) (HCC): Status: ACTIVE | Noted: 2019-08-07

## 2020-02-24 PROCEDURE — 87491 CHLMYD TRACH DNA AMP PROBE: CPT | Performed by: NURSE PRACTITIONER

## 2020-02-24 PROCEDURE — 87086 URINE CULTURE/COLONY COUNT: CPT | Performed by: NURSE PRACTITIONER

## 2020-02-24 PROCEDURE — 88175 CYTOPATH C/V AUTO FLUID REDO: CPT | Performed by: NURSE PRACTITIONER

## 2020-02-24 PROCEDURE — 87624 HPV HI-RISK TYP POOLED RSLT: CPT | Performed by: NURSE PRACTITIONER

## 2020-02-24 PROCEDURE — 87591 N.GONORRHOEAE DNA AMP PROB: CPT | Performed by: NURSE PRACTITIONER

## 2020-03-04 ENCOUNTER — LAB ENCOUNTER (OUTPATIENT)
Dept: LAB | Age: 35
End: 2020-03-04
Attending: NURSE PRACTITIONER
Payer: COMMERCIAL

## 2020-03-04 DIAGNOSIS — Z86.32 HISTORY OF GESTATIONAL DIABETES: ICD-10-CM

## 2020-03-04 DIAGNOSIS — O09.629 SUPERVISION OF HIGH-RISK PREGNANCY OF YOUNG MULTIGRAVIDA: ICD-10-CM

## 2020-03-04 LAB
ANTIBODY SCREEN: NEGATIVE
BASOPHILS # BLD AUTO: 0.03 X10(3) UL (ref 0–0.2)
BASOPHILS NFR BLD AUTO: 0.3 %
DEPRECATED RDW RBC AUTO: 39.4 FL (ref 35.1–46.3)
EOSINOPHIL # BLD AUTO: 0.06 X10(3) UL (ref 0–0.7)
EOSINOPHIL NFR BLD AUTO: 0.7 %
ERYTHROCYTE [DISTWIDTH] IN BLOOD BY AUTOMATED COUNT: 13.5 % (ref 11–15)
EST. AVERAGE GLUCOSE BLD GHB EST-MCNC: 169 MG/DL (ref 68–126)
GLUCOSE 1H P GLC SERPL-MCNC: 204 MG/DL
HBA1C MFR BLD HPLC: 7.5 % (ref ?–5.7)
HBV SURFACE AG SER-ACNC: <0.1 [IU]/L
HBV SURFACE AG SERPL QL IA: NONREACTIVE
HCT VFR BLD AUTO: 38.6 % (ref 35–48)
HGB BLD-MCNC: 12.1 G/DL (ref 12–16)
IMM GRANULOCYTES # BLD AUTO: 0.03 X10(3) UL (ref 0–1)
IMM GRANULOCYTES NFR BLD: 0.3 %
LYMPHOCYTES # BLD AUTO: 1.73 X10(3) UL (ref 1–4)
LYMPHOCYTES NFR BLD AUTO: 19.1 %
MCH RBC QN AUTO: 25.4 PG (ref 26–34)
MCHC RBC AUTO-ENTMCNC: 31.3 G/DL (ref 31–37)
MCV RBC AUTO: 80.9 FL (ref 80–100)
MONOCYTES # BLD AUTO: 0.43 X10(3) UL (ref 0.1–1)
MONOCYTES NFR BLD AUTO: 4.7 %
NEUTROPHILS # BLD AUTO: 6.79 X10 (3) UL (ref 1.5–7.7)
NEUTROPHILS # BLD AUTO: 6.79 X10(3) UL (ref 1.5–7.7)
NEUTROPHILS NFR BLD AUTO: 74.9 %
PLATELET # BLD AUTO: 337 10(3)UL (ref 150–450)
RBC # BLD AUTO: 4.77 X10(6)UL (ref 3.8–5.3)
RH BLOOD TYPE: POSITIVE
RUBV IGG SER QL: POSITIVE
RUBV IGG SER-ACNC: 189.4 IU/ML (ref 10–?)
T PALLIDUM AB SER QL IA: NONREACTIVE
WBC # BLD AUTO: 9.1 X10(3) UL (ref 4–11)

## 2020-03-04 PROCEDURE — 86762 RUBELLA ANTIBODY: CPT

## 2020-03-04 PROCEDURE — 36415 COLL VENOUS BLD VENIPUNCTURE: CPT

## 2020-03-04 PROCEDURE — 85025 COMPLETE CBC W/AUTO DIFF WBC: CPT

## 2020-03-04 PROCEDURE — 87389 HIV-1 AG W/HIV-1&-2 AB AG IA: CPT

## 2020-03-04 PROCEDURE — 86780 TREPONEMA PALLIDUM: CPT

## 2020-03-04 PROCEDURE — 86850 RBC ANTIBODY SCREEN: CPT

## 2020-03-04 PROCEDURE — 86901 BLOOD TYPING SEROLOGIC RH(D): CPT

## 2020-03-04 PROCEDURE — 82950 GLUCOSE TEST: CPT

## 2020-03-04 PROCEDURE — 86900 BLOOD TYPING SEROLOGIC ABO: CPT

## 2020-03-04 PROCEDURE — 87340 HEPATITIS B SURFACE AG IA: CPT

## 2020-03-04 PROCEDURE — 83036 HEMOGLOBIN GLYCOSYLATED A1C: CPT

## 2020-03-18 ENCOUNTER — DIABETIC EDUCATION (OUTPATIENT)
Dept: ENDOCRINOLOGY CLINIC | Facility: CLINIC | Age: 35
End: 2020-03-18
Payer: COMMERCIAL

## 2020-03-18 ENCOUNTER — LAB ENCOUNTER (OUTPATIENT)
Dept: LAB | Age: 35
End: 2020-03-18
Attending: OBSTETRICS & GYNECOLOGY
Payer: COMMERCIAL

## 2020-03-18 DIAGNOSIS — O24.312 PRE-EXISTING DIABETES MELLITUS DURING PREGNANCY IN SECOND TRIMESTER: Primary | ICD-10-CM

## 2020-03-18 DIAGNOSIS — Z36.0 SCREENING FOR CHROMOSOMAL ANOMALIES BY AMNIOCENTESIS: Primary | ICD-10-CM

## 2020-03-18 PROCEDURE — 36415 COLL VENOUS BLD VENIPUNCTURE: CPT

## 2020-03-18 PROCEDURE — 97802 MEDICAL NUTRITION INDIV IN: CPT | Performed by: DIETITIAN, REGISTERED

## 2020-03-18 NOTE — PROGRESS NOTES
Jeanna Chang   2/10/1985 was seen for Gestational Diabetes Counseling: Individual     Date: 3/18/2020  Start time: 12:30 End time: 1:15    : 2  Para: 1  EDC:     GDM Screen: A1c7.5% 3/4 Referring Provider: Nickie Dance  History of GDM:  YES 176, 152 (usually two hours)  Discussed monitoring ketones. Taking Medication:  Reviewed when medication might be indicated. Reducing Risk:  Effects of elevated blood glucose on mother/baby reviewed.   Discussed management (hyperglycemia, hypoglycemia

## 2020-03-24 PROBLEM — O24.419 GDM, CLASS A2: Status: ACTIVE | Noted: 2020-03-24

## 2020-03-24 PROBLEM — O99.211 OBESITY AFFECTING PREGNANCY IN FIRST TRIMESTER: Status: ACTIVE | Noted: 2020-03-24

## 2020-03-24 PROBLEM — O24.419 GDM, CLASS A2 (HCC): Status: ACTIVE | Noted: 2020-03-24

## 2020-05-04 ENCOUNTER — LAB ENCOUNTER (OUTPATIENT)
Dept: LAB | Age: 35
End: 2020-05-04
Attending: OBSTETRICS & GYNECOLOGY
Payer: COMMERCIAL

## 2020-05-04 DIAGNOSIS — O24.414 INSULIN CONTROLLED GESTATIONAL DIABETES MELLITUS (GDM) IN SECOND TRIMESTER: Primary | ICD-10-CM

## 2020-05-04 PROCEDURE — 36415 COLL VENOUS BLD VENIPUNCTURE: CPT

## 2020-05-04 PROCEDURE — 83036 HEMOGLOBIN GLYCOSYLATED A1C: CPT

## 2020-06-15 ENCOUNTER — OFFICE VISIT (OUTPATIENT)
Dept: PERINATAL CARE | Facility: HOSPITAL | Age: 35
End: 2020-06-15
Attending: OBSTETRICS & GYNECOLOGY
Payer: COMMERCIAL

## 2020-06-15 VITALS
HEART RATE: 120 BPM | SYSTOLIC BLOOD PRESSURE: 138 MMHG | BODY MASS INDEX: 42 KG/M2 | DIASTOLIC BLOOD PRESSURE: 87 MMHG | WEIGHT: 237 LBS

## 2020-06-15 DIAGNOSIS — O24.419 GDM, CLASS A2: ICD-10-CM

## 2020-06-15 DIAGNOSIS — Z98.891 H/O: CESAREAN SECTION: ICD-10-CM

## 2020-06-15 DIAGNOSIS — D82.1 DIGEORGE SYNDROME (HCC): ICD-10-CM

## 2020-06-15 DIAGNOSIS — E66.01 MORBID OBESITY WITH BMI OF 40.0-44.9, ADULT (HCC): ICD-10-CM

## 2020-06-15 PROCEDURE — 76825 ECHO EXAM OF FETAL HEART: CPT | Performed by: OBSTETRICS & GYNECOLOGY

## 2020-06-15 PROCEDURE — 93325 DOPPLER ECHO COLOR FLOW MAPG: CPT | Performed by: OBSTETRICS & GYNECOLOGY

## 2020-06-15 PROCEDURE — 76827 ECHO EXAM OF FETAL HEART: CPT | Performed by: OBSTETRICS & GYNECOLOGY

## 2020-06-15 PROCEDURE — 99214 OFFICE O/P EST MOD 30 MIN: CPT | Performed by: OBSTETRICS & GYNECOLOGY

## 2020-06-15 NOTE — PROGRESS NOTES
INR--2.3    Coumadin dose verified and maintained at 1 mg every Th; 2 mg all other days per protocol.    Chest CT scheduled for 20. Advised patient to contact anticoag clinic after appointment if he is started on any new medications as a result.    Next INR: 6/3/20    Anticoagulation Summary  As of 2020    INR goal:   2.0-3.0   TTR:   75.4 % (3.7 y)   INR used for dosin.3 (2020)   Warfarin maintenance plan:   1 mg (2 mg x 0.5) every Th; 2 mg (2 mg x 1) all other days   Weekly warfarin total:   13 mg   No change documented:   Amy Galindo RN   Plan last modified:   Neva Goddard RN (2020)   Next INR check:   6/3/2020   Priority:   Follow-Up - 4 Weeks   Target end date:   Indefinite    Indications    Paroxysmal atrial fibrillation (CMS/HCC) [I48.0]  Long term (current) use of anticoagulants (Resolved) [Z79.01]  AF (paroxysmal atrial fibrillation) (CMS/HCC) (Resolved) [I48.0]  Current use of long term anticoagulation [Z79.01]  AF (paroxysmal atrial fibrillation) (CMS/HCC) [I48.0]             Anticoagulation Episode Summary     INR check location:       Preferred lab:       Send INR reminders to:   [facility]    Comments:   Home # [phone] Cell # [phone]      Anticoagulation Care Providers     Provider Role Specialty Phone number    Lovely Bloom MD Referring Internal Medicine - Clinical Cardiac Electrophysiology [phone]        Patient was instructed to contact us with any unusual bleeding, bruising, any changes in medication, antibiotic use, diet or health status, or upcoming procedures. Patient was instructed to call with any questions or concerns.      Dr. Crocker is in clinic today supervising treatment.     Pt here for fetal echo  States +FM  No complaints

## 2020-06-15 NOTE — PROGRESS NOTES
Indication: dm,obesity. ____________________________________________________________________________  History: Age: 30 years. : 3 Para: 1.  Last menstrual period: 2019.  ________________________________________________________________________ the recommended four times daily assessments (fasting and two-hour post-prandial) is adequate. Of note the patient has not sent in her capillary blood glucose records since April 16 - she was sending   Her overall glucose control is inadequate.     Her com echocardiogram   3. Early onset gestational diabetes: Suspect pre-gestational diabetes due to early gestational age and elevated hemoglobin A1c  4. Family history of DiGeorge syndrome  5.   Morbid Obesity      RECOMMENDATIONS:  · Continue care with Dr. Slava Patricio

## 2020-07-13 ENCOUNTER — OFFICE VISIT (OUTPATIENT)
Dept: PERINATAL CARE | Facility: HOSPITAL | Age: 35
End: 2020-07-13
Attending: OBSTETRICS & GYNECOLOGY
Payer: COMMERCIAL

## 2020-07-13 VITALS
HEART RATE: 99 BPM | BODY MASS INDEX: 42 KG/M2 | DIASTOLIC BLOOD PRESSURE: 75 MMHG | WEIGHT: 239 LBS | SYSTOLIC BLOOD PRESSURE: 128 MMHG

## 2020-07-13 DIAGNOSIS — Z98.891 H/O: CESAREAN SECTION: ICD-10-CM

## 2020-07-13 DIAGNOSIS — O24.419 GDM, CLASS A2: ICD-10-CM

## 2020-07-13 DIAGNOSIS — E66.01 MORBID OBESITY WITH BMI OF 40.0-44.9, ADULT (HCC): ICD-10-CM

## 2020-07-13 PROCEDURE — 76816 OB US FOLLOW-UP PER FETUS: CPT | Performed by: OBSTETRICS & GYNECOLOGY

## 2020-07-13 PROCEDURE — 99214 OFFICE O/P EST MOD 30 MIN: CPT | Performed by: OBSTETRICS & GYNECOLOGY

## 2020-07-13 NOTE — PROGRESS NOTES
Indication: dm,obesity. ____________________________________________________________________________  History: Age: 30 years.  : 3 Para: 1.  ____________________________________________________________________________  Dating:  LMP: 2019 EDC: history of DiGeorge syndrome  · Obesity        DISCUSSION  During her visit we discussed and reviewed the following issues:  DIABETES - follow-up  ADA diet recommendations were reviewed and the patient's dietary compliance is adequate.   Her capillary blood trimester  · Weekly NSTs at 32 weeks; twice weekly NST's at 34 weeks        Thank you for allowing me to participate in the care of your patient. Please do not hesitate to call with any questions or concerns.     Total patient time was  25 minutes in evalu

## 2020-07-16 DIAGNOSIS — Z34.90 PREGNANCY: Primary | ICD-10-CM

## 2020-08-13 ENCOUNTER — APPOINTMENT (OUTPATIENT)
Dept: LAB | Age: 35
End: 2020-08-13
Attending: OBSTETRICS & GYNECOLOGY
Payer: COMMERCIAL

## 2020-09-02 PROCEDURE — 87653 STREP B DNA AMP PROBE: CPT | Performed by: OBSTETRICS & GYNECOLOGY

## 2020-09-02 PROCEDURE — 87081 CULTURE SCREEN ONLY: CPT | Performed by: OBSTETRICS & GYNECOLOGY

## 2020-09-08 ENCOUNTER — APPOINTMENT (OUTPATIENT)
Dept: LAB | Age: 35
End: 2020-09-08
Attending: NURSE PRACTITIONER
Payer: COMMERCIAL

## 2020-09-12 ENCOUNTER — HOSPITAL ENCOUNTER (INPATIENT)
Facility: HOSPITAL | Age: 35
LOS: 1 days | Discharge: HOME | DRG: 833 | End: 2020-09-13
Attending: OBSTETRICS & GYNECOLOGY | Admitting: OBSTETRICS & GYNECOLOGY
Payer: COMMERCIAL

## 2020-09-12 PROBLEM — E11.65 UNCONTROLLED DIABETES MELLITUS (HCC): Status: ACTIVE | Noted: 2020-09-12

## 2020-09-12 PROBLEM — IMO0002 UNCONTROLLED DIABETES MELLITUS: Status: ACTIVE | Noted: 2020-09-12

## 2020-09-12 PROBLEM — Z34.90 PREGNANCY: Status: ACTIVE | Noted: 2020-09-12

## 2020-09-12 PROBLEM — Z34.90 PREGNANCY (HCC): Status: ACTIVE | Noted: 2020-09-12

## 2020-09-12 LAB
ANTIBODY SCREEN: NEGATIVE
BASOPHILS # BLD AUTO: 0.03 X10(3) UL (ref 0–0.2)
BASOPHILS NFR BLD AUTO: 0.3 %
DEPRECATED RDW RBC AUTO: 41.3 FL (ref 35.1–46.3)
EOSINOPHIL # BLD AUTO: 0.09 X10(3) UL (ref 0–0.7)
EOSINOPHIL NFR BLD AUTO: 0.8 %
ERYTHROCYTE [DISTWIDTH] IN BLOOD BY AUTOMATED COUNT: 15.3 % (ref 11–15)
EST. AVERAGE GLUCOSE BLD GHB EST-MCNC: 137 MG/DL (ref 68–126)
GLUCOSE BLD-MCNC: 100 MG/DL (ref 70–99)
GLUCOSE BLD-MCNC: 170 MG/DL (ref 70–99)
GLUCOSE BLD-MCNC: 60 MG/DL (ref 70–99)
GLUCOSE BLD-MCNC: 89 MG/DL (ref 70–99)
HBA1C MFR BLD HPLC: 6.4 % (ref ?–5.7)
HCT VFR BLD AUTO: 33.6 % (ref 35–48)
HGB BLD-MCNC: 10.4 G/DL (ref 12–16)
IMM GRANULOCYTES # BLD AUTO: 0.05 X10(3) UL (ref 0–1)
IMM GRANULOCYTES NFR BLD: 0.4 %
LYMPHOCYTES # BLD AUTO: 1.9 X10(3) UL (ref 1–4)
LYMPHOCYTES NFR BLD AUTO: 16.2 %
MCH RBC QN AUTO: 23.3 PG (ref 26–34)
MCHC RBC AUTO-ENTMCNC: 31 G/DL (ref 31–37)
MCV RBC AUTO: 75.2 FL (ref 80–100)
MONOCYTES # BLD AUTO: 0.49 X10(3) UL (ref 0.1–1)
MONOCYTES NFR BLD AUTO: 4.2 %
NEUTROPHILS # BLD AUTO: 9.17 X10 (3) UL (ref 1.5–7.7)
NEUTROPHILS # BLD AUTO: 9.17 X10(3) UL (ref 1.5–7.7)
NEUTROPHILS NFR BLD AUTO: 78.1 %
PLATELET # BLD AUTO: 376 10(3)UL (ref 150–450)
RBC # BLD AUTO: 4.47 X10(6)UL (ref 3.8–5.3)
RH BLOOD TYPE: POSITIVE
SARS-COV-2 RNA RESP QL NAA+PROBE: NOT DETECTED
T PALLIDUM AB SER QL IA: NONREACTIVE
WBC # BLD AUTO: 11.7 X10(3) UL (ref 4–11)

## 2020-09-12 PROCEDURE — 82962 GLUCOSE BLOOD TEST: CPT

## 2020-09-12 PROCEDURE — 85025 COMPLETE CBC W/AUTO DIFF WBC: CPT | Performed by: OBSTETRICS & GYNECOLOGY

## 2020-09-12 PROCEDURE — 86780 TREPONEMA PALLIDUM: CPT | Performed by: OBSTETRICS & GYNECOLOGY

## 2020-09-12 PROCEDURE — 86900 BLOOD TYPING SEROLOGIC ABO: CPT | Performed by: OBSTETRICS & GYNECOLOGY

## 2020-09-12 PROCEDURE — 83036 HEMOGLOBIN GLYCOSYLATED A1C: CPT | Performed by: OBSTETRICS & GYNECOLOGY

## 2020-09-12 PROCEDURE — 86850 RBC ANTIBODY SCREEN: CPT | Performed by: OBSTETRICS & GYNECOLOGY

## 2020-09-12 PROCEDURE — 86901 BLOOD TYPING SEROLOGIC RH(D): CPT | Performed by: OBSTETRICS & GYNECOLOGY

## 2020-09-12 RX ORDER — ACETAMINOPHEN 500 MG
500 TABLET ORAL EVERY 6 HOURS PRN
Status: DISCONTINUED | OUTPATIENT
Start: 2020-09-12 | End: 2020-09-12

## 2020-09-12 RX ORDER — ACETAMINOPHEN 500 MG
500 TABLET ORAL EVERY 6 HOURS PRN
Status: DISCONTINUED | OUTPATIENT
Start: 2020-09-12 | End: 2020-09-13

## 2020-09-12 RX ORDER — INSULIN ASPART 100 [IU]/ML
16 INJECTION, SOLUTION INTRAVENOUS; SUBCUTANEOUS
Status: DISCONTINUED | OUTPATIENT
Start: 2020-09-12 | End: 2020-09-13

## 2020-09-12 RX ORDER — INSULIN ASPART 100 [IU]/ML
28 INJECTION, SOLUTION INTRAVENOUS; SUBCUTANEOUS
Status: ON HOLD | COMMUNITY
End: 2020-09-23

## 2020-09-12 RX ORDER — DEXTROSE, SODIUM CHLORIDE, SODIUM LACTATE, POTASSIUM CHLORIDE, AND CALCIUM CHLORIDE 5; .6; .31; .03; .02 G/100ML; G/100ML; G/100ML; G/100ML; G/100ML
INJECTION, SOLUTION INTRAVENOUS CONTINUOUS
Status: DISCONTINUED | OUTPATIENT
Start: 2020-09-12 | End: 2020-09-12

## 2020-09-12 RX ORDER — CALCIUM CARBONATE 200(500)MG
1000 TABLET,CHEWABLE ORAL
Status: DISCONTINUED | OUTPATIENT
Start: 2020-09-12 | End: 2020-09-13

## 2020-09-12 RX ORDER — ACETAMINOPHEN 500 MG
1000 TABLET ORAL EVERY 6 HOURS PRN
Status: DISCONTINUED | OUTPATIENT
Start: 2020-09-12 | End: 2020-09-13

## 2020-09-12 RX ORDER — INSULIN ASPART 100 [IU]/ML
16 INJECTION, SOLUTION INTRAVENOUS; SUBCUTANEOUS
Status: ON HOLD | COMMUNITY
End: 2020-09-23

## 2020-09-12 RX ORDER — CHOLECALCIFEROL (VITAMIN D3) 25 MCG
1 TABLET,CHEWABLE ORAL DAILY
Status: DISCONTINUED | OUTPATIENT
Start: 2020-09-12 | End: 2020-09-13

## 2020-09-12 RX ORDER — ACETAMINOPHEN 500 MG
1000 TABLET ORAL EVERY 6 HOURS PRN
Status: DISCONTINUED | OUTPATIENT
Start: 2020-09-12 | End: 2020-09-12 | Stop reason: SDUPTHER

## 2020-09-12 RX ORDER — INSULIN ASPART 100 [IU]/ML
28 INJECTION, SOLUTION INTRAVENOUS; SUBCUTANEOUS
Status: DISCONTINUED | OUTPATIENT
Start: 2020-09-12 | End: 2020-09-12 | Stop reason: SDUPTHER

## 2020-09-12 RX ORDER — INSULIN ASPART 100 [IU]/ML
4 INJECTION, SOLUTION INTRAVENOUS; SUBCUTANEOUS ONCE
Status: COMPLETED | OUTPATIENT
Start: 2020-09-12 | End: 2020-09-12

## 2020-09-12 RX ORDER — DOCUSATE SODIUM 100 MG/1
100 CAPSULE, LIQUID FILLED ORAL 2 TIMES DAILY
Status: DISCONTINUED | OUTPATIENT
Start: 2020-09-12 | End: 2020-09-13

## 2020-09-12 RX ORDER — DOCUSATE SODIUM 100 MG/1
100 CAPSULE, LIQUID FILLED ORAL 2 TIMES DAILY PRN
Status: DISCONTINUED | OUTPATIENT
Start: 2020-09-12 | End: 2020-09-13

## 2020-09-12 RX ORDER — INSULIN ASPART 100 [IU]/ML
16 INJECTION, SOLUTION INTRAVENOUS; SUBCUTANEOUS
Status: DISCONTINUED | OUTPATIENT
Start: 2020-09-13 | End: 2020-09-13

## 2020-09-12 RX ORDER — CALCIUM CARBONATE 200(500)MG
1000 TABLET,CHEWABLE ORAL
Status: DISCONTINUED | OUTPATIENT
Start: 2020-09-12 | End: 2020-09-12 | Stop reason: SDUPTHER

## 2020-09-12 RX ORDER — ZOLPIDEM TARTRATE 5 MG/1
5 TABLET ORAL NIGHTLY PRN
Status: DISCONTINUED | OUTPATIENT
Start: 2020-09-12 | End: 2020-09-13

## 2020-09-12 RX ORDER — INSULIN ASPART 100 [IU]/ML
28 INJECTION, SOLUTION INTRAVENOUS; SUBCUTANEOUS
Status: DISCONTINUED | OUTPATIENT
Start: 2020-09-12 | End: 2020-09-13

## 2020-09-12 NOTE — BH PROGRESS NOTE
24-20-52-61 Dietary office called spoke with Claudy Zamora - dietary supervisor notified per diet order pt is only to allowed 20-30 carbs for breakfast, 40-50 carbs for lunch, 40-60 carbs for dinner.

## 2020-09-12 NOTE — H&P
[place] [address] Patient Status:  Inpatient    2/10/1985 MRN [id number]   [facility] 1SW-J Attending Stephy Thakkar MD   Hosp Day # 0 PCP None Pcp     Subjective:  Sergio Roberson is a 30 year ol (negative Myesha's sign),  Trace edema       FHT:       Lab Review  Recent Labs   Lab 09/12/20  1055   RBC 4.47   HGB 10.4*   HCT 33.6*   MCV 75.2*   MCH 23.3*   MCHC 31.0   RDW 15.3*   NEPRELIM 9.17*   WBC 11.7*   .0         See Prenatal Record and

## 2020-09-12 NOTE — PLAN OF CARE
Problem: Patient/Family Goals  Goal: Patient/Family Long Term Goal  Description  Patient's Long Term Goal: Uncomplicated  section    Interventions:    VS per protocol  EFM per protocol  I&O  Chung catheter  Abd prep with clippers as needed  SCD t

## 2020-09-12 NOTE — PROGRESS NOTES
Pt is a 30year old female admitted to 1106/1106-A. Patient presents with:   Complication: GDM      Pt is  37w3d intra-uterine pregnancy. History obtained, consents signed. Oriented to room, staff, and plan of care.

## 2020-09-13 VITALS
DIASTOLIC BLOOD PRESSURE: 74 MMHG | HEIGHT: 63 IN | RESPIRATION RATE: 18 BRPM | TEMPERATURE: 98 F | BODY MASS INDEX: 43.77 KG/M2 | SYSTOLIC BLOOD PRESSURE: 137 MMHG | WEIGHT: 247 LBS | HEART RATE: 86 BPM

## 2020-09-13 LAB
GLUCOSE BLD-MCNC: 101 MG/DL (ref 70–99)
GLUCOSE BLD-MCNC: 108 MG/DL (ref 70–99)
GLUCOSE BLD-MCNC: 70 MG/DL (ref 70–99)
GLUCOSE BLD-MCNC: 86 MG/DL (ref 70–99)

## 2020-09-13 PROCEDURE — 82962 GLUCOSE BLOOD TEST: CPT

## 2020-09-13 NOTE — CONSULTS
[facility]  Maternal-Fetal Medicine Inpatient Consultation    Date of Admission:  9/12/2020  Date of Consult:  9/13/2020    Reason for Consult:   Gestational diabetes, suboptimal control    History of Present Illness:  Joelle schwartz a Known Problems Father    • Hypertension Mother    • Hypertension Maternal Grandmother    • Cancer Other         No family hx Breast/Ovarian/Colon Ca   • No Known Problems Brother       reports that she quit smoking about 3 years ago.  She has never used smo Baseline 125 BPM, moderate variability, accelerations noted, no decelerations. Reacting NST, category I tracing  [place] - irregular contractions.     NARRATIVE:   We discussed at length the fetal risks of uncontrolled blood sugars, the recommendations rega continue fetal movement monitoring 1-2 times daily    Thank you for allowing me to participate in the care of your patient. Please do not hesitate to contact me if additional questions or concerns arise.   The majority of the time (>50%) was spent in revie

## 2020-09-13 NOTE — PLAN OF CARE
Problem: Patient/Family Goals  Goal: Patient/Family Long Term Goal  Description  Patient's Long Term Goal: Uncomplicated  section    Interventions:    VS per protocol  EFM per protocol  I&O  Chung catheter  Abd prep with clippers as needed  SCD t hospitalization/illness  Description  INTERVENTIONS:  - Encourage verbalization of feelings/concerns/expectations  - Provide quiet environment  - Assist patient to identify own strengths and abilities  - Encourage patient to set small goals for self  - Enc

## 2020-09-13 NOTE — PLAN OF CARE
rn at bedside. Plan of care discussed, pt verbalizes understanding . Pt denies any concerns at this time.

## 2020-09-13 NOTE — PROGRESS NOTES
[facility]  Progress Note    Jeanna Chang Patient Status:  Inpatient    2/10/1985 MRN [id number]   [facility] 1SW-J Attending Xiomara Olvera MD   [facility] Day # 1 PCP None Pcp     Subjective:  Jeanna Chang is a 30year old fem

## 2020-09-14 DIAGNOSIS — Z34.90 PREGNANCY: Primary | ICD-10-CM

## 2020-09-14 NOTE — PROGRESS NOTES
Dr. Cris Fulton made aware of patient blood sugars at this time; Dr. Cris Fulton asked for MFM to be consulted once more.  If MFM clears her to go home then patient can be discharged home and to follow up on 9/15

## 2020-09-14 NOTE — PAYOR COMM NOTE
--------------  DISCHARGE REVIEW    Payor: Zita Kims #:  [id number]  Authorization Number: [id number]    Admit date: 9/12/20  Admit time:  8980  Discharge Date: 9/13/2020  8:50 PM     Admitting Physician: Hai Velez MD  Attending y

## 2020-09-14 NOTE — NST
Nonstress Test   Patient: Katarina Borrero    Gestation: 37w4d    NST:       Variability: Moderate           Accelerations: Yes           Decelerations: None            Baseline: 120 BPM           Uterine Irritability: No           Contractions: Not pres

## 2020-09-14 NOTE — PLAN OF CARE
Problem: Patient/Family Goals  Goal: Patient/Family Long Term Goal  Description  Patient's Long Term Goal: Uncomplicated  section    Interventions:    VS per protocol  EFM per protocol  I&O  Chung catheter  Abd prep with clippers as needed  SCD t Diabetes/Glucose Control  Goal: Glucose maintained within prescribed range  Description  INTERVENTIONS:  - Monitor Blood Glucose as ordered  - Assess for signs and symptoms of hyperglycemia and hypoglycemia  - Administer ordered medications to maintain glu

## 2020-09-14 NOTE — PAYOR COMM NOTE
--------------  ADMISSION REVIEW     Payor: Benita Verdin #:  [id number]  Authorization Number: [id number]    Admit date: 9/12/20  Admit time: 2561       Admitting Physician: John Jackson MD  Attending Physician:  No att. providers found extra ocular movement intact and sclera clear, anicteric   Lungs:   clear   Heart:   S1, S2 normal, no click, rub or gallop, regular rate and rhythm, with a physiologic 2/6 OSVALDO flow murmur.        Abdomen:  Uterus:  normal findings: soft, non-tender with gr Param Curtis RN      insulin aspart (NOVOLOG) 100 UNIT/ML vial 28 Units     Date Action Dose Route User    Discharged on 9/13/2020 9/13/2020 1828 Given 28 Units Subcutaneous (Left Lower Abdomen) Param Curtis RN      prenatal multivitamin plus Medical History:   Diagnosis Date   • Gestational diabetes       metformin 500mg tid            Past Surgical History:   Procedure Laterality Date   •    2017   •  SECTION N/A 2017     Performed by Kelli Keen MD at [facility] L+D OR (36.1 °C) (Temporal)   Resp 18   Ht 5' 3\" (1.6 m)   Wt 247 lb (112 kg)   LMP 12/25/2019 (LMP Unknown)   BMI 43.75 kg/m²   Temp:  [96.3 °F (35.7 °C)-98.8 °F (37.1 °C)] 97 °F (36.1 °C)  Pulse:  [85-96] 96  Resp:  [16-18] 18  BP: (125-140)/(60-70) 140/67  HE obesity  5. H/o , planning repeat 20     RECOMMENDATIONS:   1. Increase HS levemir to 84 units, no other diabetic medications changes  2. Consider discharge tonight of her postprandial BS are within target on the proper diet  3.   As an out

## 2020-09-14 NOTE — PROGRESS NOTES
Discharged to home per wheelchair by [facility] in stable condition with written and verbal instructions. Patient verbalizes understanding of information given.

## 2020-09-18 ENCOUNTER — APPOINTMENT (OUTPATIENT)
Dept: LAB | Age: 35
End: 2020-09-18
Attending: OBSTETRICS & GYNECOLOGY
Payer: COMMERCIAL

## 2020-09-18 DIAGNOSIS — Z34.90 PREGNANCY: ICD-10-CM

## 2020-09-19 ENCOUNTER — TELEPHONE (OUTPATIENT)
Dept: OBGYN UNIT | Facility: HOSPITAL | Age: 35
End: 2020-09-19

## 2020-09-19 LAB — SARS-COV-2 RNA RESP QL NAA+PROBE: NOT DETECTED

## 2020-09-20 ENCOUNTER — ANESTHESIA EVENT (OUTPATIENT)
Dept: OBGYN UNIT | Facility: HOSPITAL | Age: 35
End: 2020-09-20
Payer: COMMERCIAL

## 2020-09-20 NOTE — ANESTHESIA PREPROCEDURE EVALUATION
PRE-OP EVALUATION    Patient Name: Lisa Pappas    Pre-op Diagnosis: * No pre-op diagnosis entered *    Procedure(s):      Surgeon(s) and Role:     John Sofia MD - Primary    Pre-op vitals reviewed.         There is no height or weight on history.          Pulmonary    Pulmonary exam normal.                 Other findings            ASA: 3   Plan: spinal  NPO status verified and           Plan/risks discussed with: patient                Present on Admission:  **None**

## 2020-09-21 ENCOUNTER — ANESTHESIA (OUTPATIENT)
Dept: OBGYN UNIT | Facility: HOSPITAL | Age: 35
End: 2020-09-21
Payer: COMMERCIAL

## 2020-09-21 ENCOUNTER — TELEPHONE (OUTPATIENT)
Dept: OBGYN UNIT | Facility: HOSPITAL | Age: 35
End: 2020-09-21

## 2020-09-21 ENCOUNTER — HOSPITAL ENCOUNTER (INPATIENT)
Facility: HOSPITAL | Age: 35
LOS: 2 days | Discharge: HOME | End: 2020-09-23
Attending: OBSTETRICS & GYNECOLOGY | Admitting: OBSTETRICS & GYNECOLOGY
Payer: COMMERCIAL

## 2020-09-21 PROCEDURE — 86780 TREPONEMA PALLIDUM: CPT | Performed by: OBSTETRICS & GYNECOLOGY

## 2020-09-21 PROCEDURE — 86900 BLOOD TYPING SEROLOGIC ABO: CPT | Performed by: OBSTETRICS & GYNECOLOGY

## 2020-09-21 PROCEDURE — 86850 RBC ANTIBODY SCREEN: CPT | Performed by: OBSTETRICS & GYNECOLOGY

## 2020-09-21 PROCEDURE — 86901 BLOOD TYPING SEROLOGIC RH(D): CPT | Performed by: OBSTETRICS & GYNECOLOGY

## 2020-09-21 PROCEDURE — 82962 GLUCOSE BLOOD TEST: CPT

## 2020-09-21 PROCEDURE — 85025 COMPLETE CBC W/AUTO DIFF WBC: CPT | Performed by: OBSTETRICS & GYNECOLOGY

## 2020-09-21 DEVICE — INTERCEED: Type: IMPLANTABLE DEVICE | Status: FUNCTIONAL

## 2020-09-21 RX ORDER — DOCUSATE SODIUM 100 MG/1
100 CAPSULE, LIQUID FILLED ORAL
Status: DISCONTINUED | OUTPATIENT
Start: 2020-09-22 | End: 2020-09-23

## 2020-09-21 RX ORDER — DIPHENHYDRAMINE HYDROCHLORIDE 50 MG/ML
INJECTION INTRAMUSCULAR; INTRAVENOUS
Status: COMPLETED
Start: 2020-09-21 | End: 2020-09-21

## 2020-09-21 RX ORDER — NALOXONE HYDROCHLORIDE 0.4 MG/ML
0.08 INJECTION, SOLUTION INTRAMUSCULAR; INTRAVENOUS; SUBCUTANEOUS
Status: ACTIVE | OUTPATIENT
Start: 2020-09-21 | End: 2020-09-22

## 2020-09-21 RX ORDER — KETOROLAC TROMETHAMINE 30 MG/ML
30 INJECTION, SOLUTION INTRAMUSCULAR; INTRAVENOUS ONCE AS NEEDED
Status: COMPLETED | OUTPATIENT
Start: 2020-09-21 | End: 2020-09-21

## 2020-09-21 RX ORDER — MORPHINE SULFATE 2 MG/ML
INJECTION, SOLUTION INTRAMUSCULAR; INTRAVENOUS AS NEEDED
Status: DISCONTINUED | OUTPATIENT
Start: 2020-09-21 | End: 2020-09-21 | Stop reason: SURG

## 2020-09-21 RX ORDER — TRISODIUM CITRATE DIHYDRATE AND CITRIC ACID MONOHYDRATE 500; 334 MG/5ML; MG/5ML
30 SOLUTION ORAL ONCE
Status: COMPLETED | OUTPATIENT
Start: 2020-09-21 | End: 2020-09-21

## 2020-09-21 RX ORDER — CEFAZOLIN SODIUM/WATER 2 G/20 ML
2 SYRINGE (ML) INTRAVENOUS ONCE
Status: COMPLETED | OUTPATIENT
Start: 2020-09-21 | End: 2020-09-21

## 2020-09-21 RX ORDER — KETOROLAC TROMETHAMINE 30 MG/ML
INJECTION, SOLUTION INTRAMUSCULAR; INTRAVENOUS
Status: COMPLETED
Start: 2020-09-21 | End: 2020-09-21

## 2020-09-21 RX ORDER — BUPIVACAINE HYDROCHLORIDE 7.5 MG/ML
INJECTION, SOLUTION INTRASPINAL AS NEEDED
Status: DISCONTINUED | OUTPATIENT
Start: 2020-09-21 | End: 2020-09-21 | Stop reason: SURG

## 2020-09-21 RX ORDER — KETOROLAC TROMETHAMINE 30 MG/ML
30 INJECTION, SOLUTION INTRAMUSCULAR; INTRAVENOUS EVERY 6 HOURS PRN
Status: DISCONTINUED | OUTPATIENT
Start: 2020-09-21 | End: 2020-09-23

## 2020-09-21 RX ORDER — DIPHENHYDRAMINE HCL 25 MG
25 CAPSULE ORAL EVERY 4 HOURS PRN
Status: DISCONTINUED | OUTPATIENT
Start: 2020-09-21 | End: 2020-09-23

## 2020-09-21 RX ORDER — HYDROCODONE BITARTRATE AND ACETAMINOPHEN 10; 325 MG/1; MG/1
1 TABLET ORAL EVERY 4 HOURS PRN
Status: DISCONTINUED | OUTPATIENT
Start: 2020-09-21 | End: 2020-09-23

## 2020-09-21 RX ORDER — DIPHENHYDRAMINE HYDROCHLORIDE 50 MG/ML
25 INJECTION INTRAMUSCULAR; INTRAVENOUS ONCE AS NEEDED
Status: COMPLETED | OUTPATIENT
Start: 2020-09-21 | End: 2020-09-21

## 2020-09-21 RX ORDER — DEXTROSE MONOHYDRATE 25 G/50ML
50 INJECTION, SOLUTION INTRAVENOUS
Status: DISCONTINUED | OUTPATIENT
Start: 2020-09-21 | End: 2020-09-21 | Stop reason: HOSPADM

## 2020-09-21 RX ORDER — ONDANSETRON 2 MG/ML
4 INJECTION INTRAMUSCULAR; INTRAVENOUS ONCE AS NEEDED
Status: DISCONTINUED | OUTPATIENT
Start: 2020-09-21 | End: 2020-09-21 | Stop reason: HOSPADM

## 2020-09-21 RX ORDER — DIPHENHYDRAMINE HYDROCHLORIDE 50 MG/ML
12.5 INJECTION INTRAMUSCULAR; INTRAVENOUS EVERY 4 HOURS PRN
Status: DISCONTINUED | OUTPATIENT
Start: 2020-09-21 | End: 2020-09-23

## 2020-09-21 RX ORDER — SIMETHICONE 80 MG
80 TABLET,CHEWABLE ORAL 3 TIMES DAILY PRN
Status: DISCONTINUED | OUTPATIENT
Start: 2020-09-21 | End: 2020-09-23

## 2020-09-21 RX ORDER — MORPHINE SULFATE 0.5 MG/ML
0.2 INJECTION, SOLUTION EPIDURAL; INTRATHECAL; INTRAVENOUS ONCE
Status: DISCONTINUED | OUTPATIENT
Start: 2020-09-21 | End: 2020-09-21

## 2020-09-21 RX ORDER — BISACODYL 10 MG
10 SUPPOSITORY, RECTAL RECTAL
Status: DISCONTINUED | OUTPATIENT
Start: 2020-09-21 | End: 2020-09-23

## 2020-09-21 RX ORDER — IBUPROFEN 600 MG/1
600 TABLET ORAL EVERY 6 HOURS SCHEDULED
Status: DISCONTINUED | OUTPATIENT
Start: 2020-09-22 | End: 2020-09-23

## 2020-09-21 RX ORDER — HYDROCODONE BITARTRATE AND ACETAMINOPHEN 5; 325 MG/1; MG/1
1 TABLET ORAL EVERY 4 HOURS PRN
Status: DISCONTINUED | OUTPATIENT
Start: 2020-09-21 | End: 2020-09-23

## 2020-09-21 RX ORDER — MORPHINE SULFATE 4 MG/ML
2 INJECTION, SOLUTION INTRAMUSCULAR; INTRAVENOUS EVERY 5 MIN PRN
Status: DISCONTINUED | OUTPATIENT
Start: 2020-09-21 | End: 2020-09-21 | Stop reason: HOSPADM

## 2020-09-21 RX ORDER — SODIUM CHLORIDE, SODIUM LACTATE, POTASSIUM CHLORIDE, CALCIUM CHLORIDE 600; 310; 30; 20 MG/100ML; MG/100ML; MG/100ML; MG/100ML
INJECTION, SOLUTION INTRAVENOUS CONTINUOUS
Status: DISCONTINUED | OUTPATIENT
Start: 2020-09-21 | End: 2020-09-21 | Stop reason: HOSPADM

## 2020-09-21 RX ORDER — PHENYLEPHRINE HCL 10 MG/ML
VIAL (ML) INJECTION AS NEEDED
Status: DISCONTINUED | OUTPATIENT
Start: 2020-09-21 | End: 2020-09-21 | Stop reason: SURG

## 2020-09-21 RX ORDER — ONDANSETRON 2 MG/ML
4 INJECTION INTRAMUSCULAR; INTRAVENOUS EVERY 6 HOURS PRN
Status: DISCONTINUED | OUTPATIENT
Start: 2020-09-21 | End: 2020-09-23

## 2020-09-21 RX ORDER — DEXTROSE MONOHYDRATE 25 G/50ML
50 INJECTION, SOLUTION INTRAVENOUS
Status: DISCONTINUED | OUTPATIENT
Start: 2020-09-21 | End: 2020-09-23

## 2020-09-21 RX ORDER — SODIUM CHLORIDE, SODIUM LACTATE, POTASSIUM CHLORIDE, CALCIUM CHLORIDE 600; 310; 30; 20 MG/100ML; MG/100ML; MG/100ML; MG/100ML
INJECTION, SOLUTION INTRAVENOUS CONTINUOUS
Status: DISCONTINUED | OUTPATIENT
Start: 2020-09-21 | End: 2020-09-23

## 2020-09-21 RX ADMIN — MORPHINE SULFATE 0.2 MG: 2 INJECTION, SOLUTION INTRAMUSCULAR; INTRAVENOUS at 09:09:00

## 2020-09-21 RX ADMIN — PHENYLEPHRINE HCL 50 MCG: 10 MG/ML VIAL (ML) INJECTION at 09:00:00

## 2020-09-21 RX ADMIN — BUPIVACAINE HYDROCHLORIDE 1.6 ML: 7.5 INJECTION, SOLUTION INTRASPINAL at 09:09:00

## 2020-09-21 RX ADMIN — CEFAZOLIN SODIUM/WATER 2 G: 2 G/20 ML SYRINGE (ML) INTRAVENOUS at 08:53:00

## 2020-09-21 NOTE — ANESTHESIA POSTPROCEDURE EVALUATION
Yogesh 66 Patient Status:  Inpatient   Age/Gender 30year old female MRN [id number]   Location 1818 [facility] Attending Angie Ayala MD   Hosp Day # 0 PCP None Pcp       Anesthesia Post-op Note    Proce

## 2020-09-21 NOTE — PROGRESS NOTES
Pt admit to room 2193 from L/D. Report received. Mother and baby together in room. Teaching Materials given and discussed plan of care.

## 2020-09-21 NOTE — PROGRESS NOTES
Lavonne care done, pads/panties changed, abdominal binder applied. Pt tolerated well. Pt holding infant for transfer to mother baby unit room 2193 via cart, both pt and baby stable, with Rn , PCT and spouse.  Report updated to Janelle Ying Rn and Id bands verified x

## 2020-09-21 NOTE — PLAN OF CARE
Problem: BIRTH - VAGINAL/ SECTION  Goal: Fetal and maternal status remain reassuring during the birth process  Description: INTERVENTIONS:  - Monitor vital signs  - Monitor fetal heart rate  - Monitor uterine activity  - Monitor labor progression

## 2020-09-21 NOTE — ANESTHESIA PROCEDURE NOTES
Spinal Block  Performed by: Mary Anne Guardado MD  Authorized by: Mary Anne Guardado MD       General Information and Staff    Start Time:   Anesthesiologist: Mary Anne Guardado MD  Performed by:   Anesthesiologist  Patient Location:  OB  Site identification: surface land

## 2020-09-21 NOTE — PLAN OF CARE
Problem: Patient/Family Goals  Goal: Patient/Family Long Term Goal  Description: Patient's Long Term Goal: to have an uncomplicated delivery    Interventions:    - See additional Care Plan goals for specific interventions  Outcome: Progressing  Goal: Alia Tapia

## 2020-09-21 NOTE — PROGRESS NOTES
with edc 20 (38 week 5 days gestation) presents to triage 5 for 0930 R-C/s. Spouse supportive at side. Pt is gestational diabetic on metformin and insulin. Denies srom, vag bleeding or contractions. EFM tested/applied and explained.

## 2020-09-21 NOTE — OPERATIVE REPORT
[facility]   Section - Operative Note    Jasmine Bell Patient Status:  Inpatient    2/10/1985 MRN [id number]   Location [address] [facility] Attending Gini Morales MD   Hosp Day # 0 PCP None Pcp   Preoperative Diag the radiant warmer with Neonatology present. The placenta was delivered intact with a 3 vessel cord. Uterus, tubes and ovaries were normal in appearance. The uterine cavity was swept clean using a wet lap.  The first layer of the hysterotomy was closed

## 2020-09-22 PROCEDURE — 82962 GLUCOSE BLOOD TEST: CPT

## 2020-09-22 PROCEDURE — 85025 COMPLETE CBC W/AUTO DIFF WBC: CPT | Performed by: OBSTETRICS & GYNECOLOGY

## 2020-09-22 NOTE — PROGRESS NOTES
Dr Teresa Mckeon called to check on pt, report and labs given, doing well, no new orders. He will see her later today.

## 2020-09-22 NOTE — PLAN OF CARE
Problem: POSTPARTUM  Goal: Long Term Goal:Experiences normal postpartum course  Description: INTERVENTIONS:  - Assess and monitor vital signs and lab values. - Assess fundus and lochia. - Provide ice/sitz baths for perineum discomfort.   - Monitor heali pain/trauma. - Instruct and provide assistance with proper latch. - Review techniques for milk expression (breast pumping) and storage of breast milk. Provide pumping equipment/supplies, instructions and assistance, as needed.   - Encourage rooming-in and

## 2020-09-22 NOTE — PLAN OF CARE
Problem: BIRTH - VAGINAL/ SECTION  Goal: Fetal and maternal status remain reassuring during the birth process  Description: INTERVENTIONS:  - Monitor vital signs  - Monitor fetal heart rate  - Monitor uterine activity  - Monitor labor progression postpartum course  Description: INTERVENTIONS:  - Assess and monitor vital signs and lab values. - Assess fundus and lochia. - Provide ice/sitz baths for perineum discomfort.   - Monitor healing of incision/episiotomy/laceration, and assess for signs and latch.  - Review techniques for milk expression (breast pumping) and storage of breast milk. Provide pumping equipment/supplies, instructions and assistance, as needed.   - Encourage rooming-in and breast feeding on demand.  - Encourage skin-to-skin contact

## 2020-09-22 NOTE — PROGRESS NOTES
[facility]:  [facility]   Age/Gender 30year old female MRN [id number]   [facility] 2SW-J Attending Marco Antonio Adams MD   Hosp Day # 1 PCP None Pcp      Anesthesia Pain Progress No

## 2020-09-22 NOTE — PROGRESS NOTES
[facility]  Post-Partum Caesarean Section Progress Note    Angie Elkinsdrickson Patient Status:  Inpatient    2/10/1985 MRN [id number]   [facility] 2SW-J Attending Ed Marinelli MD   Hosp Day # 1 PCP None Pcp     SUBJECTIVE:    P

## 2020-09-23 VITALS
DIASTOLIC BLOOD PRESSURE: 64 MMHG | HEART RATE: 79 BPM | RESPIRATION RATE: 18 BRPM | OXYGEN SATURATION: 96 % | TEMPERATURE: 99 F | WEIGHT: 245 LBS | SYSTOLIC BLOOD PRESSURE: 126 MMHG | BODY MASS INDEX: 43.41 KG/M2 | HEIGHT: 63 IN

## 2020-09-23 PROBLEM — Z34.90 PREGNANCY: Status: RESOLVED | Noted: 2020-09-12 | Resolved: 2020-09-23

## 2020-09-23 PROBLEM — Z98.891 H/O: CESAREAN SECTION: Status: RESOLVED | Noted: 2019-07-29 | Resolved: 2020-09-23

## 2020-09-23 PROBLEM — Z30.430 ENCOUNTER FOR IUD INSERTION: Status: RESOLVED | Noted: 2019-07-24 | Resolved: 2020-09-23

## 2020-09-23 PROBLEM — O99.211 OBESITY AFFECTING PREGNANCY IN FIRST TRIMESTER: Status: RESOLVED | Noted: 2020-03-24 | Resolved: 2020-09-23

## 2020-09-23 PROBLEM — Z34.90 PREGNANCY (HCC): Status: RESOLVED | Noted: 2020-09-12 | Resolved: 2020-09-23

## 2020-09-23 PROCEDURE — 82962 GLUCOSE BLOOD TEST: CPT

## 2020-09-23 RX ORDER — HYDROCODONE BITARTRATE AND ACETAMINOPHEN 10; 325 MG/1; MG/1
1 TABLET ORAL EVERY 4 HOURS PRN
Qty: 16 TABLET | Refills: 0 | Status: SHIPPED | OUTPATIENT
Start: 2020-09-23

## 2020-09-23 NOTE — PLAN OF CARE
Problem: PAIN - ADULT  Goal: Verbalizes/displays adequate comfort level or patient's stated pain goal  Description: INTERVENTIONS:  - Encourage pt to monitor pain and request assistance  - Assess pain using appropriate pain scale  - Administer analgesics

## 2020-09-23 NOTE — PROGRESS NOTES
Spoke with patient regarding what the MFM office told me and I explained that per MFM typically with just GDM they don't follow up on blood sugar management because the placenta is delivered and the blood sugars with GDM stabilize after delivery.  Pt said t

## 2020-09-23 NOTE — DISCHARGE SUMMARY
[facility]  Discharge Summary    Cj Russ Patient Status:  Inpatient    2/10/1985 MRN [id number]   [facility] 2SW-J Attending Marco Antonio Adams MD   [facility] Day # 2 PCP None Pcp     Admit Date:  2020    EDC: Estimated D

## 2020-09-23 NOTE — PROGRESS NOTES
[facility]  Post-Partum Caesarean Section Progress Note    Konrad Barker Patient Status:  Inpatient    2/10/1985 MRN [id number]   [facility] 2SW-J Attending Jb Tillman MD   [facility] Day # 2 PCP None Pcp     SUBJECTIVE:    P

## 2020-09-23 NOTE — PROGRESS NOTES
Called Dr Jcarlos Guardado office ([facility]) and explained situation that pt is to be discharged today and we need orders on what pt is to do at home with blood sugar management.  [facility] office RN spoke with [facility] doctor who said that once placenta is delivered there really

## 2020-09-23 NOTE — PLAN OF CARE
Problem: PAIN - ADULT  Goal: Verbalizes/displays adequate comfort level or patient's stated pain goal  Description: INTERVENTIONS:  - Encourage pt to monitor pain and request assistance  - Assess pain using appropriate pain scale  - Administer analgesics Provide VTE prophylaxis as needed. - Monitor bowel function.  - Encourage ambulation and provide assistance as needed. - Assess and monitor emotional status and provide social service/psych resources as needed.   - Utilize standard precautions and use per support. Outcome: Completed  Goal: Establishment of adequate milk supply with medication/procedure interruptions  Description: INTERVENTIONS:  - Review techniques for milk expression (breast pumping).    - Provide pumping equipment/supplies, instructions,

## 2020-09-25 ENCOUNTER — TELEPHONE (OUTPATIENT)
Dept: OBGYN UNIT | Facility: HOSPITAL | Age: 35
End: 2020-09-25

## 2020-09-25 NOTE — PROGRESS NOTES
Reviewed self and infant care with mom, she verbalizes understanding of instructions reviewed. Encouraged to follow up with Mds as directed and with questions/concerns.

## 2020-10-05 PROBLEM — E11.65 UNCONTROLLED DIABETES MELLITUS (HCC): Status: RESOLVED | Noted: 2020-09-12 | Resolved: 2020-10-05

## 2020-10-05 PROBLEM — IMO0002 UNCONTROLLED DIABETES MELLITUS: Status: RESOLVED | Noted: 2020-09-12 | Resolved: 2020-10-05

## 2020-10-05 PROCEDURE — 87086 URINE CULTURE/COLONY COUNT: CPT | Performed by: OBSTETRICS & GYNECOLOGY

## 2021-01-11 PROBLEM — Z97.5 IUD (INTRAUTERINE DEVICE) IN PLACE: Status: ACTIVE | Noted: 2021-01-11

## 2024-01-26 ENCOUNTER — LAB ENCOUNTER (OUTPATIENT)
Dept: LAB | Age: 39
End: 2024-01-26
Payer: COMMERCIAL

## 2024-01-26 PROCEDURE — 36415 COLL VENOUS BLD VENIPUNCTURE: CPT

## 2024-01-26 PROCEDURE — 87340 HEPATITIS B SURFACE AG IA: CPT

## 2024-01-26 PROCEDURE — 87591 N.GONORRHOEAE DNA AMP PROB: CPT

## 2024-01-26 PROCEDURE — 83036 HEMOGLOBIN GLYCOSYLATED A1C: CPT

## 2024-01-26 PROCEDURE — 87491 CHLMYD TRACH DNA AMP PROBE: CPT

## 2024-01-26 PROCEDURE — 86592 SYPHILIS TEST NON-TREP QUAL: CPT

## 2024-02-09 PROCEDURE — 87624 HPV HI-RISK TYP POOLED RSLT: CPT

## 2024-02-09 PROCEDURE — 88175 CYTOPATH C/V AUTO FLUID REDO: CPT

## (undated) DEVICE — STAPLER SKIN INSORB 2030

## (undated) DEVICE — LARGE, DISPOSABLE ALEXIS O C-SECTION PROTECTOR - RETRACTOR: Brand: ALEXIS ® O C-SECTION PROTECTOR - RETRACTOR

## (undated) NOTE — LETTER
Lizzie Michaels 182  [address] [facility] [address] [facility]  Authorization for Surgical Operation and Procedure     Date:___________                                                                                                         Time:__________ reactions, transmission of diseases such as Hepatitis, AIDS and Cytomegalovirus (CMV) and fluid overload. In the event that I wish to have an autologous transfusion of my own blood, or a directed donor transfusion. I will discuss this with my physician. ends for purposes of reinstating the DNAR order.   10. Patients having a sterilization procedure: I understand that if the procedure is successful the results will be permanent and it will therefore be impossible for me to inseminate, conceive, or bear chil using an “IV” to give memedicine, fluids or blood during my procedure, and having a breathing tube placed to help me breathe when I’m asleep (intubation).  In the event that my heart stops working properly, I understand that my anesthesiologist will make ev bleeding, infection, seizure, irregular heart rhythms, and nerve injury.     I can change my mind about having anesthesia services at any time before I get the medicine.    _____________________________________________________________________________  [facility]

## (undated) NOTE — LETTER
[facility]  [address] [facility], [address] [place] [address]    Consent for Operation    Date: __________________    Time: _______________    1.  I authorize the performance upon [place] the following operation:                              Nicole videotape. The [facility] will not be responsible for storage or maintenance of this tape. 6. For the purpose of advancing medical education, I consent to the admittance of observers to the Operating Room.     7. I authorize the use of any specimen, organs Signature of Patient:   ___________________________    When the patient is a minor or mentally incompetent to give consent:  Signature of person authorized to consent for patient: ___________________________   Relationship to patient: _____________________ · If I am allergic to anything or have had a reaction to anesthesia before. 3. I understand how the anesthesia medicine will help me (benefits). 4. I understand that with any type of anesthesia medicine there are risks:  a.  The most common risks are: their representative has agreed to have anesthesia services.     _____________________________________________________________________________  Witness        Date   Time  I have verified that the signature is that of the patient or patient’s representative

## (undated) NOTE — Clinical Note
IMPRESSION: IUP at 26w6d Family history of DiGeorge Syndrome Maternal Obesity GDM: on ADA diet, managed by OB  RECOMMENDATIONS: Continue care with Dr. Rosa Harris four times daily capillary blood glucose assessments (fasting and 2 hour postprandial) Axel May

## (undated) NOTE — MR AVS SNAPSHOT
After Visit Summary   2017    Sherren Beth    MRN: [id number]           Visit Information     Date & Time  2017  1:00 PM Provider  [address] [facility]  Dept.  Phone  [phone]     BP 3. Enter your [employer] Activation Code exactly as it appears below. You will not need to use this code after you have completed the sign-up process. If you do not sign up before the expiration date, you must request a new code.     [employer] Activation Code: 6 non-emergency, consider your options before heading to an ER.          SAME DAY  APPOINTMENTS  Available at primary care offices      [facility]     Treatment for mild  illness or inj

## (undated) NOTE — LETTER
Lizzie Michaels 182  [address] [facility] [address], [address] [facility]  Authorization for Surgical Operation and Procedure     Date:___________                                                                                                         Time:__________ 4.   Should the need arise during my operation or immediate post-operative period, I also consent to the administration of blood and/or blood products.   Further, I understand that despite careful testing and screening of blood or blood products by maude 8.   I recognize that in the event my procedure results in extended X-Ray/fluoroscopy time, I may develop a skin reaction. 9.  If I have a Do Not Attempt Resuscitation (DNAR) order in place, that status will be suspended while in the operating room, proc 1. IKera agree to be cared for by an anesthesiologist, who is specially trained to monitor me and give me medicine to put me to sleep or keep me comfortable during my procedure.     I understand that my anesthesiologist is not an employee or 5. My doctor has explained to me other choices available to me for my care (alternatives).     6. Pregnant Patients (“epidural”):  I understand that the risks of having an epidural (medicine given into my back to help control pain during labor), include itc